# Patient Record
Sex: MALE | Race: OTHER | NOT HISPANIC OR LATINO | URBAN - METROPOLITAN AREA
[De-identification: names, ages, dates, MRNs, and addresses within clinical notes are randomized per-mention and may not be internally consistent; named-entity substitution may affect disease eponyms.]

---

## 2017-03-01 ENCOUNTER — OUTPATIENT (OUTPATIENT)
Dept: OUTPATIENT SERVICES | Facility: HOSPITAL | Age: 62
LOS: 1 days | End: 2017-03-01
Payer: MEDICAID

## 2017-03-01 DIAGNOSIS — I25.10 ATHEROSCLEROTIC HEART DISEASE OF NATIVE CORONARY ARTERY WITHOUT ANGINA PECTORIS: ICD-10-CM

## 2017-04-01 ENCOUNTER — OUTPATIENT (OUTPATIENT)
Dept: OUTPATIENT SERVICES | Facility: HOSPITAL | Age: 62
LOS: 1 days | End: 2017-04-01
Payer: MEDICAID

## 2017-04-13 DIAGNOSIS — R69 ILLNESS, UNSPECIFIED: ICD-10-CM

## 2017-04-21 PROCEDURE — 93017 CV STRESS TEST TRACING ONLY: CPT

## 2017-04-21 PROCEDURE — 78452 HT MUSCLE IMAGE SPECT MULT: CPT

## 2017-04-21 PROCEDURE — A9502: CPT

## 2017-06-01 PROCEDURE — G9001: CPT

## 2017-11-03 ENCOUNTER — APPOINTMENT (OUTPATIENT)
Dept: RADIOLOGY | Facility: IMAGING CENTER | Age: 62
End: 2017-11-03
Payer: MEDICAID

## 2017-11-03 ENCOUNTER — OUTPATIENT (OUTPATIENT)
Dept: OUTPATIENT SERVICES | Facility: HOSPITAL | Age: 62
LOS: 1 days | End: 2017-11-03
Payer: MEDICAID

## 2017-11-03 DIAGNOSIS — Z00.8 ENCOUNTER FOR OTHER GENERAL EXAMINATION: ICD-10-CM

## 2017-11-03 PROCEDURE — 71100 X-RAY EXAM RIBS UNI 2 VIEWS: CPT | Mod: 26

## 2017-11-03 PROCEDURE — 71100 X-RAY EXAM RIBS UNI 2 VIEWS: CPT

## 2018-03-06 ENCOUNTER — APPOINTMENT (OUTPATIENT)
Dept: OPHTHALMOLOGY | Facility: CLINIC | Age: 63
End: 2018-03-06
Payer: MEDICARE

## 2018-03-06 PROCEDURE — 92004 COMPRE OPH EXAM NEW PT 1/>: CPT

## 2018-05-19 ENCOUNTER — APPOINTMENT (OUTPATIENT)
Dept: RADIOLOGY | Facility: IMAGING CENTER | Age: 63
End: 2018-05-19

## 2018-06-01 ENCOUNTER — APPOINTMENT (OUTPATIENT)
Dept: UROLOGY | Facility: CLINIC | Age: 63
End: 2018-06-01
Payer: MEDICAID

## 2018-06-01 VITALS
RESPIRATION RATE: 16 BRPM | HEART RATE: 64 BPM | WEIGHT: 160 LBS | SYSTOLIC BLOOD PRESSURE: 132 MMHG | DIASTOLIC BLOOD PRESSURE: 74 MMHG | BODY MASS INDEX: 27.31 KG/M2 | HEIGHT: 64 IN | TEMPERATURE: 97.6 F

## 2018-06-01 DIAGNOSIS — Z86.39 PERSONAL HISTORY OF OTHER ENDOCRINE, NUTRITIONAL AND METABOLIC DISEASE: ICD-10-CM

## 2018-06-01 DIAGNOSIS — Z86.79 PERSONAL HISTORY OF OTHER DISEASES OF THE CIRCULATORY SYSTEM: ICD-10-CM

## 2018-06-01 PROCEDURE — 99204 OFFICE O/P NEW MOD 45 MIN: CPT | Mod: 25

## 2018-06-01 PROCEDURE — 51798 US URINE CAPACITY MEASURE: CPT | Mod: 59

## 2018-06-01 PROCEDURE — 51741 ELECTRO-UROFLOWMETRY FIRST: CPT

## 2018-06-05 LAB
BILIRUB UR QL STRIP: NEGATIVE
CLARITY UR: CLEAR
COLLECTION METHOD: NORMAL
GLUCOSE UR-MCNC: NEGATIVE
HCG UR QL: 0.2 EU/DL
HGB UR QL STRIP.AUTO: NEGATIVE
KETONES UR-MCNC: NEGATIVE
LEUKOCYTE ESTERASE UR QL STRIP: NEGATIVE
NITRITE UR QL STRIP: NEGATIVE
PH UR STRIP: 5
PROT UR STRIP-MCNC: NEGATIVE
SP GR UR STRIP: 1.02

## 2018-06-07 RX ORDER — METFORMIN HYDROCHLORIDE 1000 MG/1
1000 TABLET, COATED ORAL
Refills: 0 | Status: ACTIVE | COMMUNITY

## 2018-06-07 RX ORDER — DICLOFENAC SODIUM 100 %
POWDER (GRAM) MISCELLANEOUS
Refills: 0 | Status: ACTIVE | COMMUNITY

## 2018-06-07 RX ORDER — ATORVASTATIN CALCIUM 10 MG/1
10 TABLET, FILM COATED ORAL
Refills: 0 | Status: ACTIVE | COMMUNITY

## 2018-06-07 RX ORDER — AMLODIPINE BESYLATE AND BENAZEPRIL HYDROCHLORIDE 10; 20 MG/1; MG/1
10-20 CAPSULE ORAL
Refills: 0 | Status: ACTIVE | COMMUNITY

## 2018-07-06 ENCOUNTER — APPOINTMENT (OUTPATIENT)
Dept: UROLOGY | Facility: CLINIC | Age: 63
End: 2018-07-06
Payer: MEDICAID

## 2018-07-06 PROCEDURE — 99213 OFFICE O/P EST LOW 20 MIN: CPT

## 2018-07-09 LAB — PSA SERPL-MCNC: 0.95 NG/ML

## 2018-08-13 ENCOUNTER — OUTPATIENT (OUTPATIENT)
Dept: OUTPATIENT SERVICES | Facility: HOSPITAL | Age: 63
LOS: 1 days | End: 2018-08-13
Payer: MEDICAID

## 2018-08-13 VITALS
TEMPERATURE: 98 F | HEART RATE: 73 BPM | WEIGHT: 162.04 LBS | RESPIRATION RATE: 16 BRPM | DIASTOLIC BLOOD PRESSURE: 73 MMHG | SYSTOLIC BLOOD PRESSURE: 127 MMHG | HEIGHT: 64 IN | OXYGEN SATURATION: 97 %

## 2018-08-13 DIAGNOSIS — Z98.890 OTHER SPECIFIED POSTPROCEDURAL STATES: Chronic | ICD-10-CM

## 2018-08-13 DIAGNOSIS — I25.10 ATHEROSCLEROTIC HEART DISEASE OF NATIVE CORONARY ARTERY WITHOUT ANGINA PECTORIS: ICD-10-CM

## 2018-08-13 DIAGNOSIS — Z90.49 ACQUIRED ABSENCE OF OTHER SPECIFIED PARTS OF DIGESTIVE TRACT: Chronic | ICD-10-CM

## 2018-08-13 LAB
ALBUMIN SERPL ELPH-MCNC: 4.4 G/DL — SIGNIFICANT CHANGE UP (ref 3.3–5)
ALP SERPL-CCNC: 84 U/L — SIGNIFICANT CHANGE UP (ref 40–120)
ALT FLD-CCNC: 17 U/L — SIGNIFICANT CHANGE UP (ref 10–45)
ANION GAP SERPL CALC-SCNC: 14 MMOL/L — SIGNIFICANT CHANGE UP (ref 5–17)
AST SERPL-CCNC: 18 U/L — SIGNIFICANT CHANGE UP (ref 10–40)
BILIRUB SERPL-MCNC: 0.5 MG/DL — SIGNIFICANT CHANGE UP (ref 0.2–1.2)
BUN SERPL-MCNC: 16 MG/DL — SIGNIFICANT CHANGE UP (ref 7–23)
CALCIUM SERPL-MCNC: 9.2 MG/DL — SIGNIFICANT CHANGE UP (ref 8.4–10.5)
CHLORIDE SERPL-SCNC: 104 MMOL/L — SIGNIFICANT CHANGE UP (ref 96–108)
CO2 SERPL-SCNC: 21 MMOL/L — LOW (ref 22–31)
CREAT SERPL-MCNC: 1.11 MG/DL — SIGNIFICANT CHANGE UP (ref 0.5–1.3)
GLUCOSE BLDC GLUCOMTR-MCNC: 122 MG/DL — HIGH (ref 70–99)
GLUCOSE SERPL-MCNC: 131 MG/DL — HIGH (ref 70–99)
HCT VFR BLD CALC: 41.9 % — SIGNIFICANT CHANGE UP (ref 39–50)
HGB BLD-MCNC: 14.3 G/DL — SIGNIFICANT CHANGE UP (ref 13–17)
MCHC RBC-ENTMCNC: 30 PG — SIGNIFICANT CHANGE UP (ref 27–34)
MCHC RBC-ENTMCNC: 34.1 GM/DL — SIGNIFICANT CHANGE UP (ref 32–36)
MCV RBC AUTO: 88 FL — SIGNIFICANT CHANGE UP (ref 80–100)
PLATELET # BLD AUTO: 236 K/UL — SIGNIFICANT CHANGE UP (ref 150–400)
POTASSIUM SERPL-MCNC: 4.1 MMOL/L — SIGNIFICANT CHANGE UP (ref 3.5–5.3)
POTASSIUM SERPL-SCNC: 4.1 MMOL/L — SIGNIFICANT CHANGE UP (ref 3.5–5.3)
PROT SERPL-MCNC: 7.6 G/DL — SIGNIFICANT CHANGE UP (ref 6–8.3)
RBC # BLD: 4.76 M/UL — SIGNIFICANT CHANGE UP (ref 4.2–5.8)
RBC # FLD: 12.8 % — SIGNIFICANT CHANGE UP (ref 10.3–14.5)
SODIUM SERPL-SCNC: 139 MMOL/L — SIGNIFICANT CHANGE UP (ref 135–145)
WBC # BLD: 7.2 K/UL — SIGNIFICANT CHANGE UP (ref 3.8–10.5)
WBC # FLD AUTO: 7.2 K/UL — SIGNIFICANT CHANGE UP (ref 3.8–10.5)

## 2018-08-13 PROCEDURE — C1894: CPT

## 2018-08-13 PROCEDURE — 99152 MOD SED SAME PHYS/QHP 5/>YRS: CPT

## 2018-08-13 PROCEDURE — C1769: CPT

## 2018-08-13 PROCEDURE — 93458 L HRT ARTERY/VENTRICLE ANGIO: CPT

## 2018-08-13 PROCEDURE — 93010 ELECTROCARDIOGRAM REPORT: CPT

## 2018-08-13 PROCEDURE — C1887: CPT

## 2018-08-13 PROCEDURE — 93458 L HRT ARTERY/VENTRICLE ANGIO: CPT | Mod: 26

## 2018-08-13 PROCEDURE — 82962 GLUCOSE BLOOD TEST: CPT

## 2018-08-13 PROCEDURE — 85027 COMPLETE CBC AUTOMATED: CPT

## 2018-08-13 PROCEDURE — 93005 ELECTROCARDIOGRAM TRACING: CPT

## 2018-08-13 PROCEDURE — 80053 COMPREHEN METABOLIC PANEL: CPT

## 2018-08-13 NOTE — H&P CARDIOLOGY - PMH
Coronary artery disease involving native coronary artery of native heart, angina presence unspecified    Essential hypertension    Type 2 diabetes mellitus without complication, without long-term current use of insulin Benign prostatic hyperplasia with urinary frequency    Coronary artery disease involving native coronary artery of native heart, angina presence unspecified    Essential hypertension    Gastroesophageal reflux disease without esophagitis    Type 2 diabetes mellitus without complication, without long-term current use of insulin

## 2018-08-13 NOTE — H&P CARDIOLOGY - PSH
H/O hemorrhoidectomy    History of appendectomy H/O circumcision    H/O hemorrhoidectomy    History of appendectomy

## 2018-10-12 ENCOUNTER — APPOINTMENT (OUTPATIENT)
Dept: UROLOGY | Facility: CLINIC | Age: 63
End: 2018-10-12
Payer: MEDICAID

## 2018-10-12 PROCEDURE — 99213 OFFICE O/P EST LOW 20 MIN: CPT

## 2018-10-13 PROBLEM — E11.9 TYPE 2 DIABETES MELLITUS WITHOUT COMPLICATIONS: Chronic | Status: ACTIVE | Noted: 2018-08-13

## 2018-10-13 PROBLEM — I10 ESSENTIAL (PRIMARY) HYPERTENSION: Chronic | Status: ACTIVE | Noted: 2018-08-13

## 2018-10-13 PROBLEM — I25.10 ATHEROSCLEROTIC HEART DISEASE OF NATIVE CORONARY ARTERY WITHOUT ANGINA PECTORIS: Chronic | Status: ACTIVE | Noted: 2018-08-13

## 2018-10-13 PROBLEM — K21.9 GASTRO-ESOPHAGEAL REFLUX DISEASE WITHOUT ESOPHAGITIS: Chronic | Status: ACTIVE | Noted: 2018-08-13

## 2018-10-13 PROBLEM — N40.1 BENIGN PROSTATIC HYPERPLASIA WITH LOWER URINARY TRACT SYMPTOMS: Chronic | Status: ACTIVE | Noted: 2018-08-13

## 2018-10-22 ENCOUNTER — FORM ENCOUNTER (OUTPATIENT)
Age: 63
End: 2018-10-22

## 2018-10-23 ENCOUNTER — APPOINTMENT (OUTPATIENT)
Dept: ULTRASOUND IMAGING | Facility: CLINIC | Age: 63
End: 2018-10-23
Payer: MEDICAID

## 2018-10-23 ENCOUNTER — OUTPATIENT (OUTPATIENT)
Dept: OUTPATIENT SERVICES | Facility: HOSPITAL | Age: 63
LOS: 1 days | End: 2018-10-23
Payer: MEDICAID

## 2018-10-23 ENCOUNTER — APPOINTMENT (OUTPATIENT)
Dept: UROLOGY | Facility: CLINIC | Age: 63
End: 2018-10-23
Payer: MEDICAID

## 2018-10-23 DIAGNOSIS — Z90.49 ACQUIRED ABSENCE OF OTHER SPECIFIED PARTS OF DIGESTIVE TRACT: Chronic | ICD-10-CM

## 2018-10-23 DIAGNOSIS — Z98.890 OTHER SPECIFIED POSTPROCEDURAL STATES: Chronic | ICD-10-CM

## 2018-10-23 DIAGNOSIS — N45.1 EPIDIDYMITIS: ICD-10-CM

## 2018-10-23 PROCEDURE — 76870 US EXAM SCROTUM: CPT

## 2018-10-23 PROCEDURE — 76870 US EXAM SCROTUM: CPT | Mod: 26

## 2018-10-23 PROCEDURE — 99213 OFFICE O/P EST LOW 20 MIN: CPT

## 2018-10-25 ENCOUNTER — TRANSCRIPTION ENCOUNTER (OUTPATIENT)
Age: 63
End: 2018-10-25

## 2018-11-19 ENCOUNTER — RX RENEWAL (OUTPATIENT)
Age: 63
End: 2018-11-19

## 2018-12-27 ENCOUNTER — MEDICATION RENEWAL (OUTPATIENT)
Age: 63
End: 2018-12-27

## 2018-12-27 RX ORDER — DICLOFENAC SODIUM 75 MG/1
75 TABLET, DELAYED RELEASE ORAL
Qty: 30 | Refills: 0 | Status: ACTIVE | COMMUNITY
Start: 2018-10-23 | End: 1900-01-01

## 2019-01-25 ENCOUNTER — APPOINTMENT (OUTPATIENT)
Dept: UROLOGY | Facility: CLINIC | Age: 64
End: 2019-01-25
Payer: MEDICAID

## 2019-01-25 PROCEDURE — 99213 OFFICE O/P EST LOW 20 MIN: CPT

## 2019-01-25 NOTE — HISTORY OF PRESENT ILLNESS
[FreeTextEntry1] : Patient seen for evaluation and management of urinary issues. He has been on Tamsulosin for ~3 years but notes daytime frequency, urgency and nocturia 4-5 times. The FOS decent without straining, hesitancy or intermittency. he has DM but with recent change in medications his BS much better which in turn did NOT improve his frequency. He has no h/o retention, or UTIs. He is unsure of any PSAs but never had a PNB. \par Titrated to Terazosin - first to  7mg - noted less nocturia - 3 times and less daytime frequency. No dizziness. now on 10mg without issue and feels voiding better. Happy with results. No dizziness. \par main issue last visit was  right testis pain - started last week - also some swelling and tenderness. No fevers or  dysuria or hematuria. On exam the tail of epididymis enlarged and tender so started on Cipro - he reports no benefit. USG Notes small hydrocele only. \par Pain better - comes and goes but mostly not. \par \par

## 2019-01-30 ENCOUNTER — APPOINTMENT (OUTPATIENT)
Dept: NEUROLOGY | Facility: CLINIC | Age: 64
End: 2019-01-30
Payer: MEDICAID

## 2019-01-30 VITALS — DIASTOLIC BLOOD PRESSURE: 80 MMHG | SYSTOLIC BLOOD PRESSURE: 126 MMHG

## 2019-01-30 VITALS
DIASTOLIC BLOOD PRESSURE: 82 MMHG | HEART RATE: 68 BPM | HEIGHT: 64 IN | SYSTOLIC BLOOD PRESSURE: 146 MMHG | WEIGHT: 168 LBS | BODY MASS INDEX: 28.68 KG/M2

## 2019-01-30 PROCEDURE — 99205 OFFICE O/P NEW HI 60 MIN: CPT

## 2019-02-05 NOTE — ASSESSMENT
[FreeTextEntry1] : 62 yo man with symptoms suggestive of benign essential tremor.  It is not disabling at this point.\par \par Plan:\par 1. Observation\par 2. Follow up with Movement disorders in about 6 months\par 3. Patient agrees with plan.\par \par Tree Koehler MD\par [facility]\par \par Time Spent: 60 minutes taking history, performing examination, and counseling on diagnosis and management.\par

## 2019-02-05 NOTE — PHYSICAL EXAM
[FreeTextEntry1] : Awake, alert, oriented x 3.  Language fluent.  Comprehension intact.  Naming intact.  Repetition intact.  Affect normal.  Cranial nerves grossly intact.  Motor exam: normal bulk, normal tone, 5/5 in all four extremities.  No fasciculations. +postural hand tremor in right hand affecting fingers. Mild tremor when drawing spiral on paper.  No resting tremor.   Sensory exam: intact to LT/vibration.  DTRs: 2+ throughout.  Coordination: no dysmetria.  Gait: normal toe/heel/tandem gait.  Romberg - negative\par

## 2019-02-05 NOTE — HISTORY OF PRESENT ILLNESS
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  The tremor does not occur at rest.  No limb stiffness.  No new gait impairment.  The tremor occurs intermittently.

## 2019-03-12 ENCOUNTER — APPOINTMENT (OUTPATIENT)
Dept: OPHTHALMOLOGY | Facility: CLINIC | Age: 64
End: 2019-03-12
Payer: MEDICAID

## 2019-03-12 DIAGNOSIS — H04.123 DRY EYE SYNDROME OF BILATERAL LACRIMAL GLANDS: ICD-10-CM

## 2019-03-12 DIAGNOSIS — E11.9 TYPE 2 DIABETES MELLITUS W/OUT COMPLICATIONS: ICD-10-CM

## 2019-03-12 PROCEDURE — 92014 COMPRE OPH EXAM EST PT 1/>: CPT

## 2019-04-04 ENCOUNTER — TRANSCRIPTION ENCOUNTER (OUTPATIENT)
Age: 64
End: 2019-04-04

## 2019-04-05 ENCOUNTER — APPOINTMENT (OUTPATIENT)
Dept: UROLOGY | Facility: CLINIC | Age: 64
End: 2019-04-05

## 2019-07-23 ENCOUNTER — APPOINTMENT (OUTPATIENT)
Dept: NEUROLOGY | Facility: CLINIC | Age: 64
End: 2019-07-23

## 2019-08-27 ENCOUNTER — APPOINTMENT (OUTPATIENT)
Dept: NEUROLOGY | Facility: CLINIC | Age: 64
End: 2019-08-27
Payer: MEDICAID

## 2019-08-27 VITALS
HEART RATE: 81 BPM | SYSTOLIC BLOOD PRESSURE: 124 MMHG | WEIGHT: 170 LBS | HEIGHT: 64 IN | BODY MASS INDEX: 29.02 KG/M2 | DIASTOLIC BLOOD PRESSURE: 77 MMHG

## 2019-08-27 VITALS — DIASTOLIC BLOOD PRESSURE: 76 MMHG | SYSTOLIC BLOOD PRESSURE: 121 MMHG | HEART RATE: 83 BPM

## 2019-08-27 DIAGNOSIS — G25.0 ESSENTIAL TREMOR: ICD-10-CM

## 2019-08-27 PROCEDURE — 99215 OFFICE O/P EST HI 40 MIN: CPT

## 2019-08-27 RX ORDER — TERAZOSIN 2 MG/1
2 CAPSULE ORAL
Qty: 30 | Refills: 5 | Status: DISCONTINUED | COMMUNITY
Start: 2018-06-01 | End: 2019-08-27

## 2019-08-27 RX ORDER — CIPROFLOXACIN HYDROCHLORIDE 500 MG/1
500 TABLET, FILM COATED ORAL TWICE DAILY
Qty: 20 | Refills: 1 | Status: DISCONTINUED | COMMUNITY
Start: 2018-10-12 | End: 2019-08-27

## 2019-08-27 RX ORDER — TERAZOSIN 5 MG/1
5 CAPSULE ORAL
Qty: 30 | Refills: 5 | Status: DISCONTINUED | COMMUNITY
Start: 2018-06-01 | End: 2019-08-27

## 2019-08-27 RX ORDER — METOPROLOL TARTRATE 100 MG/1
100 TABLET, FILM COATED ORAL
Refills: 0 | Status: DISCONTINUED | COMMUNITY
End: 2019-08-27

## 2019-08-27 NOTE — DISCUSSION/SUMMARY
[FreeTextEntry1] : Marvin Shell is a 63 year old M here for evaluation of benign essential tremor. Physical examination was positive for minimal postural on the L and intention tremor bilaterally. No impairment of physical activity. Findings are consistent with a mild essential tremor. Since the patient is not having any impairment of ADLs due to these minimal tremors, there is no indication for treatment at this time.\par \par Recommendations:\par - TSH level\par - If the vivid dreaming and talking in the sleep becomes bothersome, start melatonin\par \par Follow up in 6 months

## 2019-08-27 NOTE — HISTORY OF PRESENT ILLNESS
[FreeTextEntry1] : Marvin Shell is a 63 year old male referred for evaluation of possible essential tremor. He is accompanied by his wife. \par \par The tremor is in his R hand. 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 He said it happened twice. He said that the tremor is completely gone. When he had the episodes of shaking lasted about 30 seconds. He denies any confusion after the episodes. He denies any increased stress. He was drinking a lot of caffeine at the time of his episodes.\par \par He does not drink alcohol and is unsure if caffeine helped. He denies any stiffness, difficulty walking, and has not noticed that his movements have become slower.\par \par No constipation. No decreased smell. He reports intermittent vivid dreaming, and his wife reports that he talks in his sleep. No acting out of dreams. No trouble doing buttons or using utensils. Writing has not changed.\par \par He is no longer on metoprolol. \par \par Family history: No history of tremors or Parkinson's disease\par \par Social history: Works as a , lives with his wife

## 2019-08-27 NOTE — PHYSICAL EXAM
[FreeTextEntry1] : The patient is awake, alert and oriented x 3.\par Minimal hypomimia. EOMI, no square wave jerks. \par Minimal postural tremor on the L.\par Minimal intention tremor bilaterally with finger to nose.\par No bradykinesia with L finger tap and rapid alternating movements.\par No rigidity.\par No trouble standing from a seated position with arms crossed.\par Gait with normal stride and normal arm swing. No trouble with turns. Pull test negative.\par No ataxia with heel to shin. Tandem walking is normal. \par Reflexes 1+ diffusely in upper and lower extremities.\par Strength is 5/5 in all extremities. Sensation to light touch is normal in all extremities. CN II-XII are intact.

## 2019-08-27 NOTE — REVIEW OF SYSTEMS
[As Noted in HPI] : as noted in HPI [Negative] : Gastrointestinal [Seizures] : no convulsions [Dizziness] : no dizziness [Fainting] : no fainting [Difficulty Walking] : no difficulty walking [Lightheadedness] : no lightheadedness [Inability to Walk] : able to walk [Frequent Falls] : not falling

## 2019-08-29 PROBLEM — G25.0 ESSENTIAL TREMOR: Status: ACTIVE | Noted: 2019-01-30

## 2020-02-04 ENCOUNTER — APPOINTMENT (OUTPATIENT)
Dept: UROLOGY | Facility: CLINIC | Age: 65
End: 2020-02-04
Payer: MEDICAID

## 2020-02-04 PROCEDURE — 99213 OFFICE O/P EST LOW 20 MIN: CPT

## 2020-02-04 NOTE — PHYSICAL EXAM
[General Appearance - Well Developed] : well developed [General Appearance - Well Nourished] : well nourished [Abdomen Tenderness] : non-tender [Abdomen Soft] : soft [Abdomen Mass (___ Cm)] : no abdominal mass palpated [Abdomen Hernia] : no hernia was discovered [Penis Abnormality] : normal uncircumcised penis [Urinary Bladder Findings] : the bladder was normal on palpation [Scrotum] : the scrotum was normal [Epididymis] : the epididymides were normal [Testes Tenderness] : no tenderness of the testes [Testes Mass (___cm)] : there were no testicular masses [Prostate Size ___ gm] : prostate size [unfilled] gm [No Prostate Nodules] : no prostate nodules [Oriented To Time, Place, And Person] : oriented to person, place, and time [Exaggerated Use Of Accessory Muscles For Inspiration] : no accessory muscle use [] : no respiratory distress [No Focal Deficits] : no focal deficits [Normal Station and Gait] : the gait and station were normal for the patient's age

## 2020-02-04 NOTE — HISTORY OF PRESENT ILLNESS
[FreeTextEntry1] : Patient seen for evaluation and management of urinary issues. He has been on Tamsulosin for ~3 years but notes daytime frequency, urgency and nocturia 4-5 times. The FOS decent without straining, hesitancy or intermittency. he has DM but with recent change in medications his BS much better which in turn did NOT improve his frequency. He has no h/o retention, or UTIs. He is unsure of any PSAs but never had a PNB. \par Titrated to Terazosin - first to  7mg - noted less nocturia - 3 times and less daytime frequency. No dizziness. now on 10mg without issue and feels voiding better. Happy with results. No dizziness. \par \par main issue last visit was  right testis pain - started last week - also some swelling and tenderness. No fevers or  dysuria or hematuria. On exam the tail of epididymis enlarged and tender so started on Cipro - he reports no benefit. USG Notes small hydrocele only. Pain better - comes and goes but mostly not. \par \par

## 2020-02-05 LAB
PSA FREE FLD-MCNC: 36 %
PSA FREE SERPL-MCNC: 0.41 NG/ML
PSA SERPL-MCNC: 1.12 NG/ML

## 2020-03-02 ENCOUNTER — APPOINTMENT (OUTPATIENT)
Dept: NEUROLOGY | Facility: CLINIC | Age: 65
End: 2020-03-02

## 2020-04-28 ENCOUNTER — APPOINTMENT (OUTPATIENT)
Dept: OPHTHALMOLOGY | Facility: CLINIC | Age: 65
End: 2020-04-28
Payer: MEDICAID

## 2020-04-28 ENCOUNTER — NON-APPOINTMENT (OUTPATIENT)
Age: 65
End: 2020-04-28

## 2020-04-28 PROCEDURE — 99212 OFFICE O/P EST SF 10 MIN: CPT | Mod: 95

## 2020-08-17 ENCOUNTER — RX RENEWAL (OUTPATIENT)
Age: 65
End: 2020-08-17

## 2021-02-05 ENCOUNTER — APPOINTMENT (OUTPATIENT)
Dept: UROLOGY | Facility: CLINIC | Age: 66
End: 2021-02-05
Payer: MEDICAID

## 2021-02-05 VITALS — DIASTOLIC BLOOD PRESSURE: 89 MMHG | SYSTOLIC BLOOD PRESSURE: 144 MMHG | HEART RATE: 80 BPM

## 2021-02-05 DIAGNOSIS — R35.0 FREQUENCY OF MICTURITION: ICD-10-CM

## 2021-02-05 PROCEDURE — 99072 ADDL SUPL MATRL&STAF TM PHE: CPT

## 2021-02-05 PROCEDURE — 99213 OFFICE O/P EST LOW 20 MIN: CPT

## 2021-02-06 NOTE — HISTORY OF PRESENT ILLNESS
[FreeTextEntry1] : Patient seen for evaluation and management of urinary issues. He has been on Tamsulosin for ~3 years but notes daytime frequency, urgency and nocturia 4-5 times. The FOS decent without straining, hesitancy or intermittency. he has DM but with recent change in medications his BS much better which in turn did NOT improve his frequency. He has no h/o retention, or UTIs. He is unsure of any PSAs but never had a PNB. \par Titrated to Terazosin - first to  7mg - noted less nocturia - 3 times and less daytime frequency. No dizziness. now on 10mg without issue and feels voiding better. Happy with results. No dizziness. \par LUTs the same - nocturia 2 times usually. no dysuria or hematuria. \par also has some ED - less rigidity and can loose rigidity too quickly - \par main issue last visit was  right testis pain - started last week - also some swelling and tenderness. No fevers or  dysuria or hematuria. On exam the tail of epididymis enlarged and tender so started on Cipro - he reports no benefit. USG Notes small hydrocele only. Pain better - comes and goes but mostly not.  Now no more pain or swelling\par PVR 0 \par \par

## 2021-02-06 NOTE — PHYSICAL EXAM
[General Appearance - Well Developed] : well developed [General Appearance - Well Nourished] : well nourished [Abdomen Soft] : soft [Abdomen Tenderness] : non-tender [Abdomen Mass (___ Cm)] : no abdominal mass palpated [Penis Abnormality] : normal uncircumcised penis [Urinary Bladder Findings] : the bladder was normal on palpation [Scrotum] : the scrotum was normal [Epididymis] : the epididymides were normal [Testes Tenderness] : no tenderness of the testes [Edema] : no peripheral edema [] : no respiratory distress [Exaggerated Use Of Accessory Muscles For Inspiration] : no accessory muscle use [Oriented To Time, Place, And Person] : oriented to person, place, and time [Normal Station and Gait] : the gait and station were normal for the patient's age [No Focal Deficits] : no focal deficits

## 2021-02-08 LAB
BILIRUB UR QL STRIP: NORMAL
GLUCOSE UR-MCNC: NORMAL
HCG UR QL: 0.2 EU/DL
HGB UR QL STRIP.AUTO: NORMAL
KETONES UR-MCNC: NORMAL
LEUKOCYTE ESTERASE UR QL STRIP: NORMAL
NITRITE UR QL STRIP: NORMAL
PH UR STRIP: 5.5
PROT UR STRIP-MCNC: NORMAL
PSA SERPL-MCNC: 1.5 NG/ML
SP GR UR STRIP: 1.03

## 2021-09-11 ENCOUNTER — RX RENEWAL (OUTPATIENT)
Age: 66
End: 2021-09-11

## 2022-03-04 ENCOUNTER — APPOINTMENT (OUTPATIENT)
Dept: UROLOGY | Facility: CLINIC | Age: 67
End: 2022-03-04
Payer: MEDICARE

## 2022-03-04 PROCEDURE — 99213 OFFICE O/P EST LOW 20 MIN: CPT

## 2022-03-04 RX ORDER — SILDENAFIL 100 MG/1
100 TABLET, FILM COATED ORAL
Qty: 10 | Refills: 5 | Status: ACTIVE | COMMUNITY
Start: 2021-02-05 | End: 1900-01-01

## 2022-03-04 NOTE — HISTORY OF PRESENT ILLNESS
[FreeTextEntry1] : Patient seen for evaluation and management of urinary issues. He has been on Tamsulosin for ~3 years but notes daytime frequency, urgency and nocturia 4-5 times. The FOS decent without straining, hesitancy or intermittency. he has DM but with recent change in medications his BS much better which in turn did NOT improve his frequency. He has no h/o retention, or UTIs. He is unsure of any PSAs but never had a PNB. \par Titrated to Terazosin - first to  7mg - noted less nocturia - 3 times and less daytime frequency. No dizziness. now on 10mg without issue and feels voiding better. Happy with results. No dizziness. \par LUTs the same - nocturia 2 times usually. no dysuria or hematuria. \par also has some ED - less rigidity and can loose rigidity too quickly - \par main issue last visit was  right testis pain - started last week - also some swelling and tenderness. No fevers or  dysuria or hematuria. On exam the tail of epididymis enlarged and tender so started on Cipro - he reports no benefit. USG Notes small hydrocele only. Pain better - comes and goes but mostly not.  Now no more pain or swelling\par PVR 0 \par \par 3/22 - on terazosin 10,g - still doing well; nocturia every 3 hours. Day ok with no urgency. FOS good with no pushing or waiting. No dysuria or hematuria. \par no more testis pain. \par on low dose Sildenafil 50mg \par \par

## 2022-03-07 LAB — PSA SERPL-MCNC: 1.11 NG/ML

## 2022-09-17 ENCOUNTER — RX RENEWAL (OUTPATIENT)
Age: 67
End: 2022-09-17

## 2022-09-20 ENCOUNTER — APPOINTMENT (OUTPATIENT)
Dept: DERMATOLOGY | Facility: CLINIC | Age: 67
End: 2022-09-20

## 2022-11-17 ENCOUNTER — APPOINTMENT (OUTPATIENT)
Dept: DERMATOLOGY | Facility: CLINIC | Age: 67
End: 2022-11-17

## 2022-11-17 VITALS — HEIGHT: 64 IN | BODY MASS INDEX: 28.85 KG/M2 | WEIGHT: 169 LBS

## 2022-11-17 DIAGNOSIS — Z12.83 ENCOUNTER FOR SCREENING FOR MALIGNANT NEOPLASM OF SKIN: ICD-10-CM

## 2022-11-17 DIAGNOSIS — D22.9 MELANOCYTIC NEVI, UNSPECIFIED: ICD-10-CM

## 2022-11-17 DIAGNOSIS — L64.9 ANDROGENIC ALOPECIA, UNSPECIFIED: ICD-10-CM

## 2022-11-17 DIAGNOSIS — L40.9 PSORIASIS, UNSPECIFIED: ICD-10-CM

## 2022-11-17 PROCEDURE — 99204 OFFICE O/P NEW MOD 45 MIN: CPT | Mod: GC

## 2022-11-17 RX ORDER — MOMETASONE FUROATE 1 MG/G
0.1 OINTMENT TOPICAL
Qty: 1 | Refills: 2 | Status: ACTIVE | COMMUNITY
Start: 2022-11-17 | End: 1900-01-01

## 2022-11-19 PROBLEM — Z12.83 SCREENING EXAM FOR SKIN CANCER: Status: ACTIVE | Noted: 2022-11-17

## 2022-11-19 PROBLEM — L64.9 ANDROGENETIC ALOPECIA: Status: ACTIVE | Noted: 2022-11-19

## 2022-12-23 ENCOUNTER — APPOINTMENT (OUTPATIENT)
Dept: CARDIOLOGY | Facility: CLINIC | Age: 67
End: 2022-12-23

## 2022-12-23 ENCOUNTER — NON-APPOINTMENT (OUTPATIENT)
Age: 67
End: 2022-12-23

## 2022-12-23 VITALS
OXYGEN SATURATION: 95 % | SYSTOLIC BLOOD PRESSURE: 126 MMHG | HEART RATE: 90 BPM | TEMPERATURE: 97.6 F | DIASTOLIC BLOOD PRESSURE: 83 MMHG | HEIGHT: 64 IN | BODY MASS INDEX: 28.68 KG/M2 | WEIGHT: 168 LBS

## 2022-12-23 DIAGNOSIS — R07.9 CHEST PAIN, UNSPECIFIED: ICD-10-CM

## 2022-12-23 PROCEDURE — 93000 ELECTROCARDIOGRAM COMPLETE: CPT

## 2022-12-23 PROCEDURE — 99204 OFFICE O/P NEW MOD 45 MIN: CPT

## 2022-12-23 NOTE — DISCUSSION/SUMMARY
[FreeTextEntry1] : 67 year old man with history of HTN HLD DM and FH of CAD (brother with MI at 57) who comes in to establish care. He states that he has been experiencing occasional left shoulder and chest pain. He had LHC in 2018 that minor luminal abnormalities in D1 otherwise normal coronaries.\par EKG normal\par #Chest/Shoulder pain- will check CT cor to assess for CAD\par #HTN- continue Amlodipine/Benazepril BP at goal\par #HLD- continue statin therapy [EKG obtained to assist in diagnosis and management of assessed problem(s)] : EKG obtained to assist in diagnosis and management of assessed problem(s)

## 2022-12-23 NOTE — HISTORY OF PRESENT ILLNESS
[FreeTextEntry1] : 67 year old man with history of HTN HLD DM and FH of CAD (brother with MI at 57) who comes in to establish care. He states that he has been experiencing occasional left shoulder and chest pain. He had LHC in 2018 that minor luminal abnormalities in D1 otherwise normal coronaries.\par EKG normal\par #Chest/Shoulder pain- will check CT cor to assess for CAD\par #HTN- continue Amlodipine/Benazepril BP at goal\par #HLD- continue statin therapy

## 2022-12-23 NOTE — PHYSICAL EXAM
----- Message from Delfin Banda MD sent at 5/7/2018  3:01 PM CDT -----  No growth   [Well Developed] : well developed [Well Nourished] : well nourished [No Acute Distress] : no acute distress [Normal Conjunctiva] : normal conjunctiva [Normal Venous Pressure] : normal venous pressure [No Carotid Bruit] : no carotid bruit [Normal S1, S2] : normal S1, S2 [No Murmur] : no murmur [No Rub] : no rub [No Gallop] : no gallop [Clear Lung Fields] : clear lung fields [Good Air Entry] : good air entry [No Respiratory Distress] : no respiratory distress  [Soft] : abdomen soft [Non Tender] : non-tender [No Masses/organomegaly] : no masses/organomegaly [Normal Bowel Sounds] : normal bowel sounds [Normal Gait] : normal gait [No Edema] : no edema [No Cyanosis] : no cyanosis [No Clubbing] : no clubbing [No Varicosities] : no varicosities [No Rash] : no rash [No Skin Lesions] : no skin lesions [Moves all extremities] : moves all extremities [No Focal Deficits] : no focal deficits [Normal Speech] : normal speech [Alert and Oriented] : alert and oriented [Normal memory] : normal memory

## 2023-01-11 ENCOUNTER — OUTPATIENT (OUTPATIENT)
Dept: OUTPATIENT SERVICES | Facility: HOSPITAL | Age: 68
LOS: 1 days | End: 2023-01-11
Payer: MEDICARE

## 2023-01-11 ENCOUNTER — APPOINTMENT (OUTPATIENT)
Dept: CT IMAGING | Facility: CLINIC | Age: 68
End: 2023-01-11
Payer: MEDICARE

## 2023-01-11 DIAGNOSIS — Z90.49 ACQUIRED ABSENCE OF OTHER SPECIFIED PARTS OF DIGESTIVE TRACT: Chronic | ICD-10-CM

## 2023-01-11 DIAGNOSIS — Z98.890 OTHER SPECIFIED POSTPROCEDURAL STATES: Chronic | ICD-10-CM

## 2023-01-11 DIAGNOSIS — E78.5 HYPERLIPIDEMIA, UNSPECIFIED: ICD-10-CM

## 2023-01-11 DIAGNOSIS — R07.9 CHEST PAIN, UNSPECIFIED: ICD-10-CM

## 2023-01-11 DIAGNOSIS — I10 ESSENTIAL (PRIMARY) HYPERTENSION: ICD-10-CM

## 2023-01-11 DIAGNOSIS — Z00.8 ENCOUNTER FOR OTHER GENERAL EXAMINATION: ICD-10-CM

## 2023-01-11 PROCEDURE — 75574 CT ANGIO HRT W/3D IMAGE: CPT | Mod: 26

## 2023-01-11 PROCEDURE — 75574 CT ANGIO HRT W/3D IMAGE: CPT

## 2023-01-12 ENCOUNTER — NON-APPOINTMENT (OUTPATIENT)
Age: 68
End: 2023-01-12

## 2023-01-12 DIAGNOSIS — R93.1 ABNORMAL FINDINGS ON DIAGNOSTIC IMAGING OF HEART AND CORONARY CIRCULATION: ICD-10-CM

## 2023-01-13 ENCOUNTER — RESULT REVIEW (OUTPATIENT)
Age: 68
End: 2023-01-13

## 2023-01-13 ENCOUNTER — OUTPATIENT (OUTPATIENT)
Dept: OUTPATIENT SERVICES | Facility: HOSPITAL | Age: 68
LOS: 1 days | End: 2023-01-13
Payer: MEDICARE

## 2023-01-13 DIAGNOSIS — Z90.49 ACQUIRED ABSENCE OF OTHER SPECIFIED PARTS OF DIGESTIVE TRACT: Chronic | ICD-10-CM

## 2023-01-13 DIAGNOSIS — Z98.890 OTHER SPECIFIED POSTPROCEDURAL STATES: Chronic | ICD-10-CM

## 2023-01-13 DIAGNOSIS — Z00.8 ENCOUNTER FOR OTHER GENERAL EXAMINATION: ICD-10-CM

## 2023-01-13 PROCEDURE — 0504T: CPT

## 2023-01-13 PROCEDURE — 0502T: CPT

## 2023-01-13 PROCEDURE — 0503T: CPT

## 2023-03-07 ENCOUNTER — APPOINTMENT (OUTPATIENT)
Dept: UROLOGY | Facility: CLINIC | Age: 68
End: 2023-03-07
Payer: MEDICARE

## 2023-03-07 DIAGNOSIS — N52.01 ERECTILE DYSFUNCTION DUE TO ARTERIAL INSUFFICIENCY: ICD-10-CM

## 2023-03-07 PROCEDURE — 99213 OFFICE O/P EST LOW 20 MIN: CPT

## 2023-03-07 PROCEDURE — 51798 US URINE CAPACITY MEASURE: CPT

## 2023-03-07 PROCEDURE — 51741 ELECTRO-UROFLOWMETRY FIRST: CPT

## 2023-03-07 NOTE — HISTORY OF PRESENT ILLNESS
[FreeTextEntry1] : Patient seen for evaluation and management of urinary issues. He has been on Tamsulosin for ~3 years but notes daytime frequency, urgency and nocturia 4-5 times. The FOS decent without straining, hesitancy or intermittency. he has DM but with recent change in medications his BS much better which in turn did NOT improve his frequency. He has no h/o retention, or UTIs. He is unsure of any PSAs but never had a PNB. \par Titrated to Terazosin - first to  7mg - noted less nocturia - 3 times and less daytime frequency. No dizziness. now on 10mg without issue and feels voiding better. Happy with results. No dizziness. \par LUTs the same - nocturia 2 times usually. no dysuria or hematuria. \par also has some ED - less rigidity and can loose rigidity too quickly - \par main issue last visit was  right testis pain - started last week - also some swelling and tenderness. No fevers or  dysuria or hematuria. On exam the tail of epididymis enlarged and tender so started on Cipro - he reports no benefit. USG Notes small hydrocele only. Pain better - comes and goes but mostly not.  Now no more pain or swelling\par PVR 0 \par \par 3/22 - on terazosin 10,g - still doing well; nocturia every 3 hours. Day ok with no urgency. FOS good with no pushing or waiting. No dysuria or hematuria. \par no more testis pain. \par on low dose Sildenafil 50mg \par \par 3/22 on terazosin 10mg. LUTs as above \par Using 50mg for ED - could better better\par Voided 183 peak FR 13cc PVR 0  \par \par

## 2023-03-08 LAB
APPEARANCE: CLEAR
BACTERIA: NEGATIVE
BILIRUBIN URINE: NEGATIVE
BLOOD URINE: NEGATIVE
COLOR: YELLOW
GLUCOSE QUALITATIVE U: NEGATIVE
HYALINE CASTS: 1 /LPF
KETONES URINE: NEGATIVE
LEUKOCYTE ESTERASE URINE: NEGATIVE
MICROSCOPIC-UA: NORMAL
NITRITE URINE: NEGATIVE
PH URINE: 5.5
PROTEIN URINE: NEGATIVE
PSA SERPL-MCNC: 1.35 NG/ML
RED BLOOD CELLS URINE: 1 /HPF
SPECIFIC GRAVITY URINE: 1.02
SQUAMOUS EPITHELIAL CELLS: 0 /HPF
UROBILINOGEN URINE: NORMAL
WHITE BLOOD CELLS URINE: 0 /HPF

## 2023-03-20 ENCOUNTER — OUTPATIENT (OUTPATIENT)
Dept: OUTPATIENT SERVICES | Facility: HOSPITAL | Age: 68
LOS: 1 days | Discharge: HOME | End: 2023-03-20
Payer: MEDICARE

## 2023-03-20 DIAGNOSIS — Z98.890 OTHER SPECIFIED POSTPROCEDURAL STATES: Chronic | ICD-10-CM

## 2023-03-20 DIAGNOSIS — R07.9 CHEST PAIN, UNSPECIFIED: ICD-10-CM

## 2023-03-20 DIAGNOSIS — Z90.49 ACQUIRED ABSENCE OF OTHER SPECIFIED PARTS OF DIGESTIVE TRACT: Chronic | ICD-10-CM

## 2023-03-20 LAB
ANION GAP SERPL CALC-SCNC: 13 MMOL/L — SIGNIFICANT CHANGE UP (ref 7–14)
BUN SERPL-MCNC: 18 MG/DL — SIGNIFICANT CHANGE UP (ref 7–23)
CALCIUM SERPL-MCNC: 9.4 MG/DL — SIGNIFICANT CHANGE UP (ref 8.4–10.5)
CHLORIDE SERPL-SCNC: 106 MMOL/L — SIGNIFICANT CHANGE UP (ref 98–107)
CO2 SERPL-SCNC: 22 MMOL/L — SIGNIFICANT CHANGE UP (ref 22–31)
CREAT SERPL-MCNC: 1.05 MG/DL — SIGNIFICANT CHANGE UP (ref 0.5–1.3)
EGFR: 78 ML/MIN/1.73M2 — SIGNIFICANT CHANGE UP
GLUCOSE SERPL-MCNC: 133 MG/DL — HIGH (ref 70–99)
HCT VFR BLD CALC: 44.2 % — SIGNIFICANT CHANGE UP (ref 39–50)
HGB BLD-MCNC: 14.9 G/DL — SIGNIFICANT CHANGE UP (ref 13–17)
MAGNESIUM SERPL-MCNC: 2.3 MG/DL — SIGNIFICANT CHANGE UP (ref 1.6–2.6)
MCHC RBC-ENTMCNC: 29.4 PG — SIGNIFICANT CHANGE UP (ref 27–34)
MCHC RBC-ENTMCNC: 33.7 GM/DL — SIGNIFICANT CHANGE UP (ref 32–36)
MCV RBC AUTO: 87.2 FL — SIGNIFICANT CHANGE UP (ref 80–100)
NRBC # BLD: 0 /100 WBCS — SIGNIFICANT CHANGE UP (ref 0–0)
NRBC # FLD: 0 K/UL — SIGNIFICANT CHANGE UP (ref 0–0)
PLATELET # BLD AUTO: 275 K/UL — SIGNIFICANT CHANGE UP (ref 150–400)
POTASSIUM SERPL-MCNC: 4.2 MMOL/L — SIGNIFICANT CHANGE UP (ref 3.5–5.3)
POTASSIUM SERPL-SCNC: 4.2 MMOL/L — SIGNIFICANT CHANGE UP (ref 3.5–5.3)
RBC # BLD: 5.07 M/UL — SIGNIFICANT CHANGE UP (ref 4.2–5.8)
RBC # FLD: 13.5 % — SIGNIFICANT CHANGE UP (ref 10.3–14.5)
SODIUM SERPL-SCNC: 141 MMOL/L — SIGNIFICANT CHANGE UP (ref 135–145)
WBC # BLD: 7.23 K/UL — SIGNIFICANT CHANGE UP (ref 3.8–10.5)
WBC # FLD AUTO: 7.23 K/UL — SIGNIFICANT CHANGE UP (ref 3.8–10.5)

## 2023-03-20 PROCEDURE — 93454 CORONARY ARTERY ANGIO S&I: CPT | Mod: 26

## 2023-03-20 PROCEDURE — 99152 MOD SED SAME PHYS/QHP 5/>YRS: CPT

## 2023-03-20 PROCEDURE — 93010 ELECTROCARDIOGRAM REPORT: CPT

## 2023-03-20 RX ORDER — TERAZOSIN HYDROCHLORIDE 10 MG/1
1 CAPSULE ORAL
Qty: 0 | Refills: 0 | DISCHARGE

## 2023-03-20 RX ORDER — RANITIDINE HYDROCHLORIDE 150 MG/1
1 TABLET, FILM COATED ORAL
Qty: 0 | Refills: 0 | DISCHARGE

## 2023-03-20 RX ORDER — OMEPRAZOLE 10 MG/1
1 CAPSULE, DELAYED RELEASE ORAL
Qty: 0 | Refills: 0 | DISCHARGE

## 2023-03-20 RX ORDER — AMLODIPINE BESYLATE 2.5 MG/1
1 TABLET ORAL
Qty: 0 | Refills: 0 | DISCHARGE

## 2023-03-20 RX ORDER — ATORVASTATIN CALCIUM 80 MG/1
1 TABLET, FILM COATED ORAL
Qty: 0 | Refills: 0 | DISCHARGE

## 2023-03-20 RX ORDER — METOPROLOL TARTRATE 50 MG
0.5 TABLET ORAL
Qty: 0 | Refills: 0 | DISCHARGE

## 2023-03-20 RX ORDER — SODIUM CHLORIDE 9 MG/ML
3 INJECTION INTRAMUSCULAR; INTRAVENOUS; SUBCUTANEOUS EVERY 8 HOURS
Refills: 0 | Status: DISCONTINUED | OUTPATIENT
Start: 2023-03-20 | End: 2023-04-03

## 2023-03-20 RX ORDER — ASPIRIN/CALCIUM CARB/MAGNESIUM 324 MG
1 TABLET ORAL
Qty: 0 | Refills: 0 | DISCHARGE

## 2023-03-20 RX ORDER — ACETAMINOPHEN 500 MG
650 TABLET ORAL ONCE
Refills: 0 | Status: COMPLETED | OUTPATIENT
Start: 2023-03-20 | End: 2023-03-20

## 2023-03-20 RX ORDER — METFORMIN HYDROCHLORIDE 850 MG/1
1 TABLET ORAL
Qty: 0 | Refills: 0 | DISCHARGE

## 2023-03-20 RX ADMIN — Medication 650 MILLIGRAM(S): at 10:50

## 2023-03-20 NOTE — H&P CARDIOLOGY - ALCOHOL USE HISTORY SINGLE SELECT
Problem: Patient Care Overview (Adult)  Goal: Plan of Care Review  Outcome: Ongoing (interventions implemented as appropriate)   03/04/18 0356   Coping/Psychosocial Response Interventions   Plan Of Care Reviewed With patient   Patient Care Overview   Progress improving   Outcome Evaluation   Outcome Summary/Follow up Plan Pt. has been maintaining NSR with good blood pressures. Resting well with no complaints.        Problem: Arrhythmia/Dysrhythmia (Symptomatic) (Adult)  Goal: Signs and Symptoms of Listed Potential Problems Will be Absent or Manageable (Arrhythmia/Dysrhythmia)  Outcome: Ongoing (interventions implemented as appropriate)   03/04/18 0356   Arrhythmia/Dysrhythmia (Symptomatic)   Problems Assessed (Arrhythmia/Dysrhythmia) all   Problems Present (Arrhythmia/Dysrhythmia) none          never

## 2023-03-20 NOTE — H&P CARDIOLOGY - NSICDXPASTMEDICALHX_GEN_ALL_CORE_FT
PAST MEDICAL HISTORY:  Benign prostatic hyperplasia with urinary frequency     Coronary artery disease involving native coronary artery of native heart, angina presence unspecified     Essential hypertension     Gastroesophageal reflux disease without esophagitis     Type 2 diabetes mellitus without complication, without long-term current use of insulin

## 2023-03-20 NOTE — H&P CARDIOLOGY - HISTORY OF PRESENT ILLNESS
68 y/o M with PMH of HTN, HLD, DM type II presented to [facility] for C for abnormal CT coronaries. Patient stated that the symptoms of chest pain started about 2 months ago. Patient stated that the chest pain is located on the left side, characterized as a sharp pain, intermittent, aggravated with exertion and when climbing a flight of stairs, relief with rest, with radiation of the chest pain to the left shoulder and arm, with a severity of 6/10. Patient endorsed this to his cardiologist who did a CT coronaries and was noted to be abnormal. Patient otherwise denied any SOB, fevers, chills, N/V/D/C, abdominal pain, dysuria, melena, hematochezia, recent travel, sick contact, cough, body aches, pleuritic or positional chest pain.     Referring: Dr. Garcia

## 2023-03-23 ENCOUNTER — APPOINTMENT (OUTPATIENT)
Dept: CARDIOLOGY | Facility: CLINIC | Age: 68
End: 2023-03-23
Payer: MEDICARE

## 2023-03-23 ENCOUNTER — NON-APPOINTMENT (OUTPATIENT)
Age: 68
End: 2023-03-23

## 2023-03-23 VITALS
DIASTOLIC BLOOD PRESSURE: 78 MMHG | BODY MASS INDEX: 28.68 KG/M2 | SYSTOLIC BLOOD PRESSURE: 138 MMHG | TEMPERATURE: 97.6 F | HEART RATE: 105 BPM | OXYGEN SATURATION: 98 % | HEIGHT: 64 IN | WEIGHT: 168 LBS

## 2023-03-23 PROCEDURE — 99214 OFFICE O/P EST MOD 30 MIN: CPT

## 2023-03-23 PROCEDURE — 93000 ELECTROCARDIOGRAM COMPLETE: CPT

## 2023-03-23 NOTE — DISCUSSION/SUMMARY
[EKG obtained to assist in diagnosis and management of assessed problem(s)] : EKG obtained to assist in diagnosis and management of assessed problem(s) [FreeTextEntry1] : 67 year old man with history of HTN HLD DM and FH of CAD (brother with MI at 57) who comes in for followup after [facility] 3/20/23 in setting of FFR positive CT. Medical mgmt of CAD.\par EKG normal\par #CAD- Patient does have CAD, would continue ASA and statin therapy\par #HTN- continue Amlodipine/Benazepril BP at goal\par #HLD- continue statin therapy

## 2023-03-23 NOTE — ADDENDUM
[FreeTextEntry1] : CT Cor report reviewed and discussed results with patient\par Will proceed with C

## 2023-07-24 ENCOUNTER — APPOINTMENT (OUTPATIENT)
Dept: CARDIOLOGY | Facility: CLINIC | Age: 68
End: 2023-07-24
Payer: MEDICARE

## 2023-07-24 ENCOUNTER — NON-APPOINTMENT (OUTPATIENT)
Age: 68
End: 2023-07-24

## 2023-07-24 VITALS
HEIGHT: 64 IN | SYSTOLIC BLOOD PRESSURE: 132 MMHG | OXYGEN SATURATION: 94 % | WEIGHT: 168 LBS | DIASTOLIC BLOOD PRESSURE: 78 MMHG | TEMPERATURE: 97.6 F | BODY MASS INDEX: 28.68 KG/M2 | HEART RATE: 80 BPM

## 2023-07-24 PROCEDURE — 99214 OFFICE O/P EST MOD 30 MIN: CPT

## 2023-07-24 PROCEDURE — 93000 ELECTROCARDIOGRAM COMPLETE: CPT

## 2023-07-24 NOTE — DISCUSSION/SUMMARY
[FreeTextEntry1] : 67 year old man with history of HTN HLD DM and FH of CAD (brother with MI at 57) who comes in for followup after [facility] 3/20/23 in setting of FFR positive CT. Medical mgmt of CAD.\par EKG normal\par #CAD- Patient does have CAD, would continue ASA and statin therapy\par #HTN- continue Amlodipine/Benazepril BP at goal\par #HLD- continue statin therapy [EKG obtained to assist in diagnosis and management of assessed problem(s)] : EKG obtained to assist in diagnosis and management of assessed problem(s)

## 2023-07-24 NOTE — HISTORY OF PRESENT ILLNESS
[FreeTextEntry1] : 67 year old man with history of HTN HLD DM and FH of CAD (brother with MI at 57) who comes in to [facility] care. He states that he has been experiencing occasional left shoulder and chest pain. He had LHC in 2018 that minor luminal abnormalities in D1 otherwise normal coronaries.\par \par Had CT Cor in January with positive FFR of diag lesion\par Had cardiac cath March 2023  (report not available but discussed with interventionalist) that showed nonobstructive CAD, no intervention performed\par Here for followup \par EKG normal\par #CAD- Patient does have CAD, would continue ASA and statin therapy\par #HTN- continue Amlodipine/Benazepril BP at goal\par #HLD- continue statin therapy

## 2023-12-08 ENCOUNTER — APPOINTMENT (OUTPATIENT)
Dept: UROLOGY | Facility: CLINIC | Age: 68
End: 2023-12-08
Payer: MEDICARE

## 2023-12-08 VITALS
HEART RATE: 87 BPM | DIASTOLIC BLOOD PRESSURE: 79 MMHG | RESPIRATION RATE: 16 BRPM | SYSTOLIC BLOOD PRESSURE: 119 MMHG | TEMPERATURE: 97.2 F

## 2023-12-08 DIAGNOSIS — N45.1 EPIDIDYMITIS: ICD-10-CM

## 2023-12-08 PROCEDURE — 51798 US URINE CAPACITY MEASURE: CPT

## 2023-12-08 PROCEDURE — 99213 OFFICE O/P EST LOW 20 MIN: CPT

## 2023-12-08 RX ORDER — CIPROFLOXACIN HYDROCHLORIDE 500 MG/1
500 TABLET, FILM COATED ORAL
Qty: 20 | Refills: 0 | Status: ACTIVE | COMMUNITY
Start: 2023-12-08 | End: 1900-01-01

## 2023-12-12 LAB
APPEARANCE: CLEAR
BACTERIA UR CULT: NORMAL
BACTERIA: NEGATIVE /HPF
BILIRUBIN URINE: NEGATIVE
BLOOD URINE: NEGATIVE
CAST: 1 /LPF
COLOR: NORMAL
EPITHELIAL CELLS: 0 /HPF
GLUCOSE QUALITATIVE U: NEGATIVE MG/DL
KETONES URINE: ABNORMAL MG/DL
LEUKOCYTE ESTERASE URINE: NEGATIVE
MICROSCOPIC-UA: NORMAL
NITRITE URINE: NEGATIVE
PH URINE: 6.5
PROTEIN URINE: NORMAL MG/DL
RED BLOOD CELLS URINE: 1 /HPF
SPECIFIC GRAVITY URINE: 1.03
UROBILINOGEN URINE: 1 MG/DL
WHITE BLOOD CELLS URINE: 0 /HPF

## 2023-12-29 ENCOUNTER — APPOINTMENT (OUTPATIENT)
Dept: CARDIOLOGY | Facility: CLINIC | Age: 68
End: 2023-12-29
Payer: MEDICARE

## 2023-12-29 ENCOUNTER — NON-APPOINTMENT (OUTPATIENT)
Age: 68
End: 2023-12-29

## 2023-12-29 VITALS
BODY MASS INDEX: 28.68 KG/M2 | OXYGEN SATURATION: 96 % | HEIGHT: 64 IN | TEMPERATURE: 97 F | HEART RATE: 77 BPM | DIASTOLIC BLOOD PRESSURE: 75 MMHG | WEIGHT: 168 LBS | SYSTOLIC BLOOD PRESSURE: 125 MMHG

## 2023-12-29 PROCEDURE — 99214 OFFICE O/P EST MOD 30 MIN: CPT

## 2023-12-29 PROCEDURE — 93000 ELECTROCARDIOGRAM COMPLETE: CPT

## 2023-12-29 NOTE — HISTORY OF PRESENT ILLNESS
[FreeTextEntry1] : 68  year old man with history of HTN HLD DM and FH of CAD (brother with MI at 57) who comes in for followup. He states that he has been experiencing occasional left shoulder and chest pain. He had LHC in 2018 that minor luminal abnormalities in D1 otherwise normal coronaries.  Had CT Cor in January 2023 with positive FFR of diag lesion Had cardiac cath March 2023  (report not available but discussed with interventionalist) that showed nonobstructive CAD, no intervention performed Here for followup  EKG normal #CAD- Patient does have CAD, would continue ASA and statin therapy #HTN- continue Amlodipine/Benazepril BP at goal #HLD- continue statin therapy

## 2023-12-29 NOTE — DISCUSSION/SUMMARY
[EKG obtained to assist in diagnosis and management of assessed problem(s)] : EKG obtained to assist in diagnosis and management of assessed problem(s) [FreeTextEntry1] : 68  year old man with history of HTN HLD DM and FH of CAD (brother with MI at 57) who comes in for followup after [facility] 3/20/23 in setting of FFR positive CT. Medical mgmt of CAD. EKG normal #CAD- Patient does have CAD, would continue ASA and statin therapy #HTN- continue Amlodipine/Benazepril BP at goal #HLD- continue statin therapy

## 2024-01-10 ENCOUNTER — APPOINTMENT (OUTPATIENT)
Dept: UROLOGY | Facility: CLINIC | Age: 69
End: 2024-01-10
Payer: MEDICARE

## 2024-01-10 VITALS
SYSTOLIC BLOOD PRESSURE: 126 MMHG | TEMPERATURE: 97.8 F | HEART RATE: 90 BPM | DIASTOLIC BLOOD PRESSURE: 72 MMHG | RESPIRATION RATE: 16 BRPM

## 2024-01-10 DIAGNOSIS — N40.1 BENIGN PROSTATIC HYPERPLASIA WITH LOWER URINARY TRACT SYMPMS: ICD-10-CM

## 2024-01-10 PROCEDURE — 99213 OFFICE O/P EST LOW 20 MIN: CPT

## 2024-01-10 RX ORDER — SILDENAFIL 100 MG/1
100 TABLET, FILM COATED ORAL
Qty: 12 | Refills: 5 | Status: ACTIVE | OUTPATIENT
Start: 2023-03-07

## 2024-01-10 NOTE — HISTORY OF PRESENT ILLNESS
[FreeTextEntry1] : Patient seen for evaluation and management of urinary issues. He has been on Tamsulosin for ~3 years but notes daytime frequency, urgency and nocturia 4-5 times. The FOS decent without straining, hesitancy or intermittency. he has DM but with recent change in medications his BS much better which in turn did NOT improve his frequency. He has no h/o retention, or UTIs. He is unsure of any PSAs but never had a PNB.  Titrated to Terazosin - first to  7mg - noted less nocturia - 3 times and less daytime frequency. No dizziness. now on 10mg without issue and feels voiding better. Happy with results. No dizziness.  LUTs the same - nocturia 2 times usually. no dysuria or hematuria.  also has some ED - less rigidity and can loose rigidity too quickly -  main issue last visit was  right testis pain - started last week - also some swelling and tenderness. No fevers or  dysuria or hematuria. On exam the tail of epididymis enlarged and tender so started on Cipro - he reports no benefit. USG Notes small hydrocele only. Pain better - comes and goes but mostly not.  Now no more pain or swelling PVR 0   3/22 - on terazosin 10,g - still doing well; nocturia every 3 hours. Day ok with no urgency. FOS good with no pushing or waiting. No dysuria or hematuria.  no more testis pain.  on low dose Sildenafil 50mg   3/22 on terazosin 10mg. LUTs as above  Using 50mg for ED - could better better Voided 183 peak FR 13cc PVR 0    12/8/23 - Pt presents today to f/u on previous c/o nocturia and ED.   Today he c/o right sided flank pain with radiation to the right groin. He further informs that for the last 2 weeks his right testicle is "way bigger than the left". PCP informed that he "might have a hernia there, but it is small". Denies constipation, NVD  He informs that nocturia has gotten worse 5-7x nightly, with some dribbling. Still only urinates 3-4x daily. Denies hematuria but confirms occasional burning. Intermittent low flow concerns and there is straining at start of urination. Remains on Terazosin 10mg  Last PSA March 2023 - 1.35 Urine vol: only 83 PVR: 0 ml  1/24 - treated with Cipro for epididymtis - much better. remains on [id number] - had nocturia 3-4 times though only voids 3 times day with no urgency FOS good. uses Sildenafil

## 2024-02-24 ENCOUNTER — RX RENEWAL (OUTPATIENT)
Age: 69
End: 2024-02-24

## 2024-02-24 RX ORDER — TERAZOSIN 10 MG/1
10 CAPSULE ORAL
Qty: 90 | Refills: 3 | Status: ACTIVE | COMMUNITY
Start: 2018-07-06 | End: 1900-01-01

## 2024-03-05 ENCOUNTER — APPOINTMENT (OUTPATIENT)
Dept: UROLOGY | Facility: CLINIC | Age: 69
End: 2024-03-05

## 2024-03-09 NOTE — H&P CARDIOLOGY - CARDIOVASCULAR DETAILS
Preliminary knee xray negative; will call with any changes in the official report    Drink plenty of fluids and get plenty of rest.  Take medication with food (motrin)   Avoid strenuous lifting, use proper body mechanics.  Apply heating pad, Ice pack, Biofreeze or Epsom salt baths as needed.  Follow-up with your primary care doctor as needed. If pain continues over the next 7-10 days, call the clinic and we can send an orthopedic referral   Present to the Emergency Department with any significant change or worsening symptoms including calf pain, redness or warmth, sudden increase in swelling or pain, fever, or difficulty walking.    positive S1/positive S2

## 2024-07-02 ENCOUNTER — APPOINTMENT (OUTPATIENT)
Dept: CARDIOLOGY | Facility: CLINIC | Age: 69
End: 2024-07-02
Payer: MEDICARE

## 2024-07-02 ENCOUNTER — NON-APPOINTMENT (OUTPATIENT)
Age: 69
End: 2024-07-02

## 2024-07-02 VITALS
BODY MASS INDEX: 28 KG/M2 | HEIGHT: 64 IN | SYSTOLIC BLOOD PRESSURE: 131 MMHG | OXYGEN SATURATION: 97 % | WEIGHT: 164 LBS | DIASTOLIC BLOOD PRESSURE: 82 MMHG | HEART RATE: 71 BPM

## 2024-07-02 DIAGNOSIS — I10 ESSENTIAL (PRIMARY) HYPERTENSION: ICD-10-CM

## 2024-07-02 DIAGNOSIS — I25.10 ATHEROSCLEROTIC HEART DISEASE OF NATIVE CORONARY ARTERY W/OUT ANGINA PECTORIS: ICD-10-CM

## 2024-07-02 DIAGNOSIS — E78.5 HYPERLIPIDEMIA, UNSPECIFIED: ICD-10-CM

## 2024-07-02 PROCEDURE — 93000 ELECTROCARDIOGRAM COMPLETE: CPT

## 2024-07-02 PROCEDURE — G2211 COMPLEX E/M VISIT ADD ON: CPT

## 2024-07-02 PROCEDURE — 99214 OFFICE O/P EST MOD 30 MIN: CPT

## 2024-07-10 ENCOUNTER — APPOINTMENT (OUTPATIENT)
Dept: UROLOGY | Facility: CLINIC | Age: 69
End: 2024-07-10
Payer: MEDICARE

## 2024-07-10 DIAGNOSIS — N52.01 ERECTILE DYSFUNCTION DUE TO ARTERIAL INSUFFICIENCY: ICD-10-CM

## 2024-07-10 DIAGNOSIS — N40.1 BENIGN PROSTATIC HYPERPLASIA WITH LOWER URINARY TRACT SYMPMS: ICD-10-CM

## 2024-07-10 PROCEDURE — 99213 OFFICE O/P EST LOW 20 MIN: CPT

## 2024-07-10 PROCEDURE — G2211 COMPLEX E/M VISIT ADD ON: CPT

## 2024-07-10 PROCEDURE — 51798 US URINE CAPACITY MEASURE: CPT

## 2024-07-10 RX ORDER — SILDENAFIL 100 MG/1
100 TABLET, FILM COATED ORAL
Qty: 60 | Refills: 3 | Status: ACTIVE | COMMUNITY
Start: 2024-07-10 | End: 1900-01-01

## 2024-10-31 ENCOUNTER — NON-APPOINTMENT (OUTPATIENT)
Age: 69
End: 2024-10-31

## 2024-11-01 ENCOUNTER — APPOINTMENT (OUTPATIENT)
Dept: UROLOGY | Facility: CLINIC | Age: 69
End: 2024-11-01
Payer: MEDICARE

## 2024-11-01 ENCOUNTER — NON-APPOINTMENT (OUTPATIENT)
Age: 69
End: 2024-11-01

## 2024-11-01 VITALS
WEIGHT: 164 LBS | BODY MASS INDEX: 28 KG/M2 | HEIGHT: 64 IN | TEMPERATURE: 97.2 F | DIASTOLIC BLOOD PRESSURE: 83 MMHG | SYSTOLIC BLOOD PRESSURE: 129 MMHG | HEART RATE: 80 BPM

## 2024-11-01 DIAGNOSIS — N45.1 EPIDIDYMITIS: ICD-10-CM

## 2024-11-01 PROCEDURE — 99213 OFFICE O/P EST LOW 20 MIN: CPT

## 2024-11-01 RX ORDER — CIPROFLOXACIN HYDROCHLORIDE 500 MG/1
500 TABLET, FILM COATED ORAL TWICE DAILY
Qty: 20 | Refills: 1 | Status: ACTIVE | COMMUNITY
Start: 2024-11-01 | End: 1900-01-01

## 2024-11-03 LAB — BACTERIA UR CULT: NORMAL

## 2024-11-25 ENCOUNTER — OUTPATIENT (OUTPATIENT)
Dept: OUTPATIENT SERVICES | Facility: HOSPITAL | Age: 69
LOS: 1 days | End: 2024-11-25
Payer: MEDICARE

## 2024-11-25 ENCOUNTER — APPOINTMENT (OUTPATIENT)
Dept: ULTRASOUND IMAGING | Facility: IMAGING CENTER | Age: 69
End: 2024-11-25
Payer: MEDICARE

## 2024-11-25 DIAGNOSIS — N45.1 EPIDIDYMITIS: ICD-10-CM

## 2024-11-25 DIAGNOSIS — Z98.890 OTHER SPECIFIED POSTPROCEDURAL STATES: Chronic | ICD-10-CM

## 2024-11-25 DIAGNOSIS — Z90.49 ACQUIRED ABSENCE OF OTHER SPECIFIED PARTS OF DIGESTIVE TRACT: Chronic | ICD-10-CM

## 2024-11-25 PROCEDURE — 76870 US EXAM SCROTUM: CPT | Mod: 26

## 2024-11-25 PROCEDURE — 76870 US EXAM SCROTUM: CPT

## 2024-12-04 ENCOUNTER — APPOINTMENT (OUTPATIENT)
Dept: CARDIOLOGY | Facility: CLINIC | Age: 69
End: 2024-12-04

## 2024-12-20 ENCOUNTER — APPOINTMENT (OUTPATIENT)
Dept: UROLOGY | Facility: CLINIC | Age: 69
End: 2024-12-20
Payer: MEDICARE

## 2024-12-20 VITALS — RESPIRATION RATE: 19 BRPM | DIASTOLIC BLOOD PRESSURE: 84 MMHG | SYSTOLIC BLOOD PRESSURE: 131 MMHG | HEART RATE: 96 BPM

## 2024-12-20 DIAGNOSIS — N45.1 EPIDIDYMITIS: ICD-10-CM

## 2024-12-20 DIAGNOSIS — N40.1 BENIGN PROSTATIC HYPERPLASIA WITH LOWER URINARY TRACT SYMPMS: ICD-10-CM

## 2024-12-20 PROCEDURE — 99212 OFFICE O/P EST SF 10 MIN: CPT

## 2024-12-20 PROCEDURE — G2211 COMPLEX E/M VISIT ADD ON: CPT | Mod: NC

## 2024-12-28 ENCOUNTER — OUTPATIENT (OUTPATIENT)
Dept: OUTPATIENT SERVICES | Facility: HOSPITAL | Age: 69
LOS: 1 days | End: 2024-12-28
Payer: MEDICARE

## 2024-12-28 ENCOUNTER — APPOINTMENT (OUTPATIENT)
Dept: ULTRASOUND IMAGING | Facility: IMAGING CENTER | Age: 69
End: 2024-12-28
Payer: MEDICARE

## 2024-12-28 DIAGNOSIS — N40.1 BENIGN PROSTATIC HYPERPLASIA WITH LOWER URINARY TRACT SYMPTOMS: ICD-10-CM

## 2024-12-28 DIAGNOSIS — Z90.49 ACQUIRED ABSENCE OF OTHER SPECIFIED PARTS OF DIGESTIVE TRACT: Chronic | ICD-10-CM

## 2024-12-28 DIAGNOSIS — Z98.890 OTHER SPECIFIED POSTPROCEDURAL STATES: Chronic | ICD-10-CM

## 2024-12-28 PROCEDURE — 76857 US EXAM PELVIC LIMITED: CPT

## 2024-12-28 PROCEDURE — 76857 US EXAM PELVIC LIMITED: CPT | Mod: 26

## 2025-01-02 RX ORDER — FINASTERIDE 5 MG/1
5 TABLET, FILM COATED ORAL
Qty: 90 | Refills: 3 | Status: ACTIVE | COMMUNITY
Start: 2025-01-02 | End: 1900-01-01

## 2025-02-17 ENCOUNTER — RX RENEWAL (OUTPATIENT)
Age: 70
End: 2025-02-17

## 2025-04-08 ENCOUNTER — APPOINTMENT (OUTPATIENT)
Dept: UROLOGY | Facility: CLINIC | Age: 70
End: 2025-04-08

## 2025-05-06 ENCOUNTER — APPOINTMENT (OUTPATIENT)
Dept: UROLOGY | Facility: CLINIC | Age: 70
End: 2025-05-06
Payer: MEDICARE

## 2025-05-06 VITALS — HEART RATE: 85 BPM | SYSTOLIC BLOOD PRESSURE: 119 MMHG | DIASTOLIC BLOOD PRESSURE: 74 MMHG | TEMPERATURE: 97.2 F

## 2025-05-06 DIAGNOSIS — N40.1 BENIGN PROSTATIC HYPERPLASIA WITH LOWER URINARY TRACT SYMPMS: ICD-10-CM

## 2025-05-06 PROCEDURE — 51798 US URINE CAPACITY MEASURE: CPT

## 2025-05-06 PROCEDURE — 51741 ELECTRO-UROFLOWMETRY FIRST: CPT

## 2025-05-06 PROCEDURE — 99213 OFFICE O/P EST LOW 20 MIN: CPT

## 2025-05-07 LAB
APPEARANCE: CLEAR
BACTERIA: NEGATIVE /HPF
BILIRUBIN URINE: NEGATIVE
BLOOD URINE: NEGATIVE
CAST: 0 /LPF
COLOR: YELLOW
EPITHELIAL CELLS: 0 /HPF
GLUCOSE QUALITATIVE U: NEGATIVE MG/DL
KETONES URINE: NEGATIVE MG/DL
LEUKOCYTE ESTERASE URINE: ABNORMAL
MICROSCOPIC-UA: NORMAL
NITRITE URINE: NEGATIVE
PH URINE: 5.5
PROTEIN URINE: NEGATIVE MG/DL
PSA SERPL-MCNC: 1.06 NG/ML
RED BLOOD CELLS URINE: 0 /HPF
SPECIFIC GRAVITY URINE: 1.02
UROBILINOGEN URINE: 0.2 MG/DL
WHITE BLOOD CELLS URINE: 0 /HPF

## 2025-05-08 LAB — BACTERIA UR CULT: NORMAL

## 2025-08-13 ENCOUNTER — TRANSCRIPTION ENCOUNTER (OUTPATIENT)
Age: 70
End: 2025-08-13

## 2025-08-13 ENCOUNTER — RX RENEWAL (OUTPATIENT)
Age: 70
End: 2025-08-13

## 2025-08-13 ENCOUNTER — OUTPATIENT (OUTPATIENT)
Dept: OUTPATIENT SERVICES | Facility: HOSPITAL | Age: 70
LOS: 1 days | End: 2025-08-13
Payer: MEDICARE

## 2025-08-13 VITALS
RESPIRATION RATE: 16 BRPM | SYSTOLIC BLOOD PRESSURE: 147 MMHG | HEART RATE: 64 BPM | OXYGEN SATURATION: 97 % | DIASTOLIC BLOOD PRESSURE: 84 MMHG

## 2025-08-13 VITALS
HEART RATE: 79 BPM | OXYGEN SATURATION: 95 % | DIASTOLIC BLOOD PRESSURE: 85 MMHG | HEIGHT: 65 IN | TEMPERATURE: 98 F | RESPIRATION RATE: 16 BRPM | WEIGHT: 166.01 LBS | SYSTOLIC BLOOD PRESSURE: 134 MMHG

## 2025-08-13 DIAGNOSIS — Z98.890 OTHER SPECIFIED POSTPROCEDURAL STATES: Chronic | ICD-10-CM

## 2025-08-13 DIAGNOSIS — Z90.49 ACQUIRED ABSENCE OF OTHER SPECIFIED PARTS OF DIGESTIVE TRACT: Chronic | ICD-10-CM

## 2025-08-13 LAB
ANION GAP SERPL CALC-SCNC: 13 MMOL/L — SIGNIFICANT CHANGE UP (ref 7–14)
BUN SERPL-MCNC: 18 MG/DL — SIGNIFICANT CHANGE UP (ref 7–23)
CALCIUM SERPL-MCNC: 9.3 MG/DL — SIGNIFICANT CHANGE UP (ref 8.4–10.5)
CHLORIDE SERPL-SCNC: 106 MMOL/L — SIGNIFICANT CHANGE UP (ref 98–107)
CO2 SERPL-SCNC: 20 MMOL/L — LOW (ref 22–31)
CREAT SERPL-MCNC: 0.85 MG/DL — SIGNIFICANT CHANGE UP (ref 0.5–1.3)
EGFR: 95 ML/MIN/1.73M2 — SIGNIFICANT CHANGE UP
EGFR: 95 ML/MIN/1.73M2 — SIGNIFICANT CHANGE UP
GLUCOSE SERPL-MCNC: 121 MG/DL — HIGH (ref 70–99)
HCT VFR BLD CALC: 41.5 % — SIGNIFICANT CHANGE UP (ref 39–50)
HGB BLD-MCNC: 14.1 G/DL — SIGNIFICANT CHANGE UP (ref 13–17)
MCHC RBC-ENTMCNC: 29.4 PG — SIGNIFICANT CHANGE UP (ref 27–34)
MCHC RBC-ENTMCNC: 34 G/DL — SIGNIFICANT CHANGE UP (ref 32–36)
MCV RBC AUTO: 86.5 FL — SIGNIFICANT CHANGE UP (ref 80–100)
NRBC # BLD AUTO: 0 K/UL — SIGNIFICANT CHANGE UP (ref 0–0)
NRBC # FLD: 0 K/UL — SIGNIFICANT CHANGE UP (ref 0–0)
NRBC BLD AUTO-RTO: 0 /100 WBCS — SIGNIFICANT CHANGE UP (ref 0–0)
PLATELET # BLD AUTO: 239 K/UL — SIGNIFICANT CHANGE UP (ref 150–400)
PMV BLD: 9 FL — SIGNIFICANT CHANGE UP (ref 7–13)
POTASSIUM SERPL-MCNC: 4.3 MMOL/L — SIGNIFICANT CHANGE UP (ref 3.5–5.3)
POTASSIUM SERPL-SCNC: 4.3 MMOL/L — SIGNIFICANT CHANGE UP (ref 3.5–5.3)
RBC # BLD: 4.8 M/UL — SIGNIFICANT CHANGE UP (ref 4.2–5.8)
RBC # FLD: 13.2 % — SIGNIFICANT CHANGE UP (ref 10.3–14.5)
SODIUM SERPL-SCNC: 139 MMOL/L — SIGNIFICANT CHANGE UP (ref 135–145)
WBC # BLD: 7.45 K/UL — SIGNIFICANT CHANGE UP (ref 3.8–10.5)
WBC # FLD AUTO: 7.45 K/UL — SIGNIFICANT CHANGE UP (ref 3.8–10.5)

## 2025-08-13 PROCEDURE — 93010 ELECTROCARDIOGRAM REPORT: CPT

## 2025-08-13 PROCEDURE — 99152 MOD SED SAME PHYS/QHP 5/>YRS: CPT

## 2025-08-13 PROCEDURE — 93454 CORONARY ARTERY ANGIO S&I: CPT | Mod: 26

## 2025-08-13 RX ADMIN — Medication 1000 MILLILITER(S): at 13:45

## 2025-08-13 RX ADMIN — Medication 3 MILLILITER(S): at 13:46

## 2025-08-13 RX ADMIN — Medication 75 MILLILITER(S): at 13:45
